# Patient Record
Sex: FEMALE | Race: BLACK OR AFRICAN AMERICAN | NOT HISPANIC OR LATINO | URBAN - METROPOLITAN AREA
[De-identification: names, ages, dates, MRNs, and addresses within clinical notes are randomized per-mention and may not be internally consistent; named-entity substitution may affect disease eponyms.]

---

## 2021-09-13 ENCOUNTER — CLINICAL SUPPORT (OUTPATIENT)
Dept: REHABILITATION | Facility: OTHER | Age: 41
End: 2021-09-13
Payer: MEDICAID

## 2021-09-13 DIAGNOSIS — M54.50 CHRONIC MIDLINE LOW BACK PAIN WITHOUT SCIATICA: ICD-10-CM

## 2021-09-13 DIAGNOSIS — G89.29 CHRONIC MIDLINE LOW BACK PAIN WITHOUT SCIATICA: ICD-10-CM

## 2021-09-13 PROCEDURE — 97161 PT EVAL LOW COMPLEX 20 MIN: CPT | Mod: PN | Performed by: PHYSICAL THERAPIST

## 2021-09-15 ENCOUNTER — PATIENT MESSAGE (OUTPATIENT)
Dept: REHABILITATION | Facility: OTHER | Age: 41
End: 2021-09-15

## 2021-09-18 ENCOUNTER — CLINICAL SUPPORT (OUTPATIENT)
Dept: REHABILITATION | Facility: OTHER | Age: 41
End: 2021-09-18
Payer: MEDICAID

## 2021-09-18 DIAGNOSIS — G89.29 CHRONIC MIDLINE LOW BACK PAIN WITHOUT SCIATICA: Primary | ICD-10-CM

## 2021-09-18 DIAGNOSIS — M54.50 CHRONIC MIDLINE LOW BACK PAIN WITHOUT SCIATICA: Primary | ICD-10-CM

## 2021-09-18 PROCEDURE — 97110 THERAPEUTIC EXERCISES: CPT | Mod: PN

## 2021-09-20 ENCOUNTER — CLINICAL SUPPORT (OUTPATIENT)
Dept: REHABILITATION | Facility: OTHER | Age: 41
End: 2021-09-20
Payer: MEDICAID

## 2021-09-20 DIAGNOSIS — G89.29 CHRONIC MIDLINE LOW BACK PAIN WITHOUT SCIATICA: Primary | ICD-10-CM

## 2021-09-20 DIAGNOSIS — M54.50 CHRONIC MIDLINE LOW BACK PAIN WITHOUT SCIATICA: Primary | ICD-10-CM

## 2021-09-20 PROCEDURE — 97110 THERAPEUTIC EXERCISES: CPT | Mod: PN | Performed by: PHYSICAL THERAPIST

## 2021-09-24 ENCOUNTER — CLINICAL SUPPORT (OUTPATIENT)
Dept: REHABILITATION | Facility: OTHER | Age: 41
End: 2021-09-24
Payer: MEDICAID

## 2021-09-24 DIAGNOSIS — M54.50 CHRONIC MIDLINE LOW BACK PAIN WITHOUT SCIATICA: Primary | ICD-10-CM

## 2021-09-24 DIAGNOSIS — G89.29 CHRONIC MIDLINE LOW BACK PAIN WITHOUT SCIATICA: Primary | ICD-10-CM

## 2021-09-24 PROCEDURE — 97110 THERAPEUTIC EXERCISES: CPT | Mod: PN | Performed by: PHYSICAL THERAPIST

## 2021-09-29 ENCOUNTER — CLINICAL SUPPORT (OUTPATIENT)
Dept: REHABILITATION | Facility: OTHER | Age: 41
End: 2021-09-29
Payer: MEDICAID

## 2021-09-29 DIAGNOSIS — M54.50 CHRONIC MIDLINE LOW BACK PAIN WITHOUT SCIATICA: ICD-10-CM

## 2021-09-29 DIAGNOSIS — G89.29 CHRONIC MIDLINE LOW BACK PAIN WITHOUT SCIATICA: ICD-10-CM

## 2021-09-29 PROCEDURE — 97110 THERAPEUTIC EXERCISES: CPT | Mod: PN,CQ

## 2021-10-01 ENCOUNTER — CLINICAL SUPPORT (OUTPATIENT)
Dept: REHABILITATION | Facility: OTHER | Age: 41
End: 2021-10-01
Payer: MEDICAID

## 2021-10-01 DIAGNOSIS — M54.50 CHRONIC MIDLINE LOW BACK PAIN WITHOUT SCIATICA: ICD-10-CM

## 2021-10-01 DIAGNOSIS — G89.29 CHRONIC MIDLINE LOW BACK PAIN WITHOUT SCIATICA: ICD-10-CM

## 2021-10-01 PROCEDURE — 97110 THERAPEUTIC EXERCISES: CPT | Mod: PN,CQ

## 2021-10-05 ENCOUNTER — CLINICAL SUPPORT (OUTPATIENT)
Dept: REHABILITATION | Facility: OTHER | Age: 41
End: 2021-10-05
Payer: MEDICAID

## 2021-10-05 DIAGNOSIS — G89.29 CHRONIC MIDLINE LOW BACK PAIN WITHOUT SCIATICA: ICD-10-CM

## 2021-10-05 DIAGNOSIS — M54.50 CHRONIC MIDLINE LOW BACK PAIN WITHOUT SCIATICA: ICD-10-CM

## 2021-10-05 PROCEDURE — 97110 THERAPEUTIC EXERCISES: CPT | Mod: PN,CQ

## 2021-10-07 ENCOUNTER — CLINICAL SUPPORT (OUTPATIENT)
Dept: REHABILITATION | Facility: OTHER | Age: 41
End: 2021-10-07
Payer: MEDICAID

## 2021-10-07 DIAGNOSIS — G89.29 CHRONIC MIDLINE LOW BACK PAIN WITHOUT SCIATICA: ICD-10-CM

## 2021-10-07 DIAGNOSIS — M54.50 CHRONIC MIDLINE LOW BACK PAIN WITHOUT SCIATICA: ICD-10-CM

## 2021-10-07 PROCEDURE — 97110 THERAPEUTIC EXERCISES: CPT | Mod: PN

## 2021-10-11 ENCOUNTER — CLINICAL SUPPORT (OUTPATIENT)
Dept: REHABILITATION | Facility: OTHER | Age: 41
End: 2021-10-11
Payer: MEDICAID

## 2021-10-11 DIAGNOSIS — M54.50 CHRONIC MIDLINE LOW BACK PAIN WITHOUT SCIATICA: ICD-10-CM

## 2021-10-11 DIAGNOSIS — G89.29 CHRONIC MIDLINE LOW BACK PAIN WITHOUT SCIATICA: ICD-10-CM

## 2021-10-11 PROCEDURE — 97110 THERAPEUTIC EXERCISES: CPT | Mod: PN

## 2021-10-20 ENCOUNTER — CLINICAL SUPPORT (OUTPATIENT)
Dept: REHABILITATION | Facility: OTHER | Age: 41
End: 2021-10-20
Payer: MEDICAID

## 2021-10-20 DIAGNOSIS — G89.29 CHRONIC MIDLINE LOW BACK PAIN WITHOUT SCIATICA: ICD-10-CM

## 2021-10-20 DIAGNOSIS — M54.50 CHRONIC MIDLINE LOW BACK PAIN WITHOUT SCIATICA: ICD-10-CM

## 2021-10-20 PROCEDURE — 97110 THERAPEUTIC EXERCISES: CPT | Mod: PN

## 2021-10-26 ENCOUNTER — CLINICAL SUPPORT (OUTPATIENT)
Dept: REHABILITATION | Facility: OTHER | Age: 41
End: 2021-10-26
Payer: MEDICAID

## 2021-10-26 DIAGNOSIS — M54.50 CHRONIC MIDLINE LOW BACK PAIN WITHOUT SCIATICA: ICD-10-CM

## 2021-10-26 DIAGNOSIS — G89.29 CHRONIC MIDLINE LOW BACK PAIN WITHOUT SCIATICA: ICD-10-CM

## 2021-10-26 PROCEDURE — 97110 THERAPEUTIC EXERCISES: CPT | Mod: PN

## 2021-10-29 ENCOUNTER — CLINICAL SUPPORT (OUTPATIENT)
Dept: REHABILITATION | Facility: OTHER | Age: 41
End: 2021-10-29
Payer: MEDICAID

## 2021-10-29 DIAGNOSIS — G89.29 CHRONIC MIDLINE LOW BACK PAIN WITHOUT SCIATICA: ICD-10-CM

## 2021-10-29 DIAGNOSIS — M54.50 CHRONIC MIDLINE LOW BACK PAIN WITHOUT SCIATICA: ICD-10-CM

## 2021-10-29 PROCEDURE — 97110 THERAPEUTIC EXERCISES: CPT | Mod: PN

## 2021-11-05 ENCOUNTER — CLINICAL SUPPORT (OUTPATIENT)
Dept: REHABILITATION | Facility: OTHER | Age: 41
End: 2021-11-05
Payer: MEDICAID

## 2021-11-05 DIAGNOSIS — M54.50 CHRONIC MIDLINE LOW BACK PAIN WITHOUT SCIATICA: ICD-10-CM

## 2021-11-05 DIAGNOSIS — G89.29 CHRONIC MIDLINE LOW BACK PAIN WITHOUT SCIATICA: ICD-10-CM

## 2021-11-05 PROCEDURE — 97110 THERAPEUTIC EXERCISES: CPT | Mod: PN

## 2021-11-10 ENCOUNTER — CLINICAL SUPPORT (OUTPATIENT)
Dept: REHABILITATION | Facility: OTHER | Age: 41
End: 2021-11-10
Payer: MEDICAID

## 2021-11-10 DIAGNOSIS — M54.50 CHRONIC MIDLINE LOW BACK PAIN WITHOUT SCIATICA: ICD-10-CM

## 2021-11-10 DIAGNOSIS — G89.29 CHRONIC MIDLINE LOW BACK PAIN WITHOUT SCIATICA: ICD-10-CM

## 2021-11-10 PROCEDURE — 97110 THERAPEUTIC EXERCISES: CPT | Mod: PN

## 2021-11-15 ENCOUNTER — CLINICAL SUPPORT (OUTPATIENT)
Dept: REHABILITATION | Facility: OTHER | Age: 41
End: 2021-11-15
Payer: MEDICAID

## 2021-11-15 DIAGNOSIS — M54.50 CHRONIC MIDLINE LOW BACK PAIN WITHOUT SCIATICA: Primary | ICD-10-CM

## 2021-11-15 DIAGNOSIS — G89.29 CHRONIC MIDLINE LOW BACK PAIN WITHOUT SCIATICA: Primary | ICD-10-CM

## 2021-11-15 PROCEDURE — 97110 THERAPEUTIC EXERCISES: CPT | Mod: PN

## 2021-11-22 ENCOUNTER — CLINICAL SUPPORT (OUTPATIENT)
Dept: REHABILITATION | Facility: OTHER | Age: 41
End: 2021-11-22
Payer: MEDICAID

## 2021-11-22 DIAGNOSIS — M54.50 CHRONIC MIDLINE LOW BACK PAIN WITHOUT SCIATICA: ICD-10-CM

## 2021-11-22 DIAGNOSIS — G89.29 CHRONIC MIDLINE LOW BACK PAIN WITHOUT SCIATICA: ICD-10-CM

## 2021-11-22 PROCEDURE — 97110 THERAPEUTIC EXERCISES: CPT | Mod: PN

## 2021-11-26 ENCOUNTER — CLINICAL SUPPORT (OUTPATIENT)
Dept: REHABILITATION | Facility: OTHER | Age: 41
End: 2021-11-26
Payer: MEDICAID

## 2021-11-26 DIAGNOSIS — M54.50 CHRONIC MIDLINE LOW BACK PAIN WITHOUT SCIATICA: ICD-10-CM

## 2021-11-26 DIAGNOSIS — G89.29 CHRONIC MIDLINE LOW BACK PAIN WITHOUT SCIATICA: ICD-10-CM

## 2021-11-26 PROCEDURE — 97110 THERAPEUTIC EXERCISES: CPT | Mod: PN

## 2021-11-26 PROCEDURE — 97112 NEUROMUSCULAR REEDUCATION: CPT | Mod: PN

## 2021-12-20 ENCOUNTER — CLINICAL SUPPORT (OUTPATIENT)
Dept: REHABILITATION | Facility: OTHER | Age: 41
End: 2021-12-20
Payer: MEDICAID

## 2021-12-20 DIAGNOSIS — M54.50 CHRONIC MIDLINE LOW BACK PAIN WITHOUT SCIATICA: Primary | ICD-10-CM

## 2021-12-20 DIAGNOSIS — G89.29 CHRONIC MIDLINE LOW BACK PAIN WITHOUT SCIATICA: Primary | ICD-10-CM

## 2021-12-20 PROCEDURE — 97110 THERAPEUTIC EXERCISES: CPT | Mod: PN

## 2021-12-23 ENCOUNTER — CLINICAL SUPPORT (OUTPATIENT)
Dept: REHABILITATION | Facility: OTHER | Age: 41
End: 2021-12-23
Payer: MEDICAID

## 2021-12-23 DIAGNOSIS — M54.50 CHRONIC MIDLINE LOW BACK PAIN WITHOUT SCIATICA: ICD-10-CM

## 2021-12-23 DIAGNOSIS — G89.29 CHRONIC MIDLINE LOW BACK PAIN WITHOUT SCIATICA: ICD-10-CM

## 2021-12-23 PROCEDURE — 97112 NEUROMUSCULAR REEDUCATION: CPT | Mod: PN

## 2021-12-23 PROCEDURE — 97110 THERAPEUTIC EXERCISES: CPT | Mod: PN

## 2021-12-29 ENCOUNTER — CLINICAL SUPPORT (OUTPATIENT)
Dept: REHABILITATION | Facility: OTHER | Age: 41
End: 2021-12-29
Payer: MEDICAID

## 2021-12-29 DIAGNOSIS — M54.50 CHRONIC MIDLINE LOW BACK PAIN WITHOUT SCIATICA: ICD-10-CM

## 2021-12-29 DIAGNOSIS — G89.29 CHRONIC MIDLINE LOW BACK PAIN WITHOUT SCIATICA: ICD-10-CM

## 2021-12-29 PROCEDURE — 97110 THERAPEUTIC EXERCISES: CPT | Mod: PN

## 2022-01-05 ENCOUNTER — CLINICAL SUPPORT (OUTPATIENT)
Dept: REHABILITATION | Facility: OTHER | Age: 42
End: 2022-01-05
Payer: MEDICAID

## 2022-01-05 DIAGNOSIS — G89.29 CHRONIC MIDLINE LOW BACK PAIN WITHOUT SCIATICA: Primary | ICD-10-CM

## 2022-01-05 DIAGNOSIS — M54.50 CHRONIC MIDLINE LOW BACK PAIN WITHOUT SCIATICA: Primary | ICD-10-CM

## 2022-01-05 PROCEDURE — 97112 NEUROMUSCULAR REEDUCATION: CPT | Mod: PN

## 2022-01-05 PROCEDURE — 97110 THERAPEUTIC EXERCISES: CPT | Mod: PN

## 2022-01-05 NOTE — PROGRESS NOTES
"  Physical Therapy Daily Treatment Note     Name: Marisabel Celaya  Clinic Number: [id number]  Therapy Diagnosis:   Encounter Diagnosis   Name Primary?    Chronic midline low back pain without sciatica Yes     Physician: Deepika Yancey NP    Visit Date: 1/5/2022    Physician Orders: PT Eval and Treat   Medical Diagnosis from Referral: Low back pain (M54.5)  Evaluation Date: 9/13/2021  Authorization Period Expiration:1/31/2022  Plan of Care Expiration: 01/31/2022  Visit # / Visits authorized: 21/36 (20 visits total)  Re-assessment: 12/20/2021  FOTO: 12/23/2021     Time In: 1:45 pm  Time out: 2:30 pm  Total Billable Time: 45 minutes    Precautions: Standard    Subjective   Pt reports: she has been feeling sore in her legs over the past couple days. Other than that she has nothing new to report.     She was compliant with home exercise program.  Response to previous treatment: felt good  Function: n/a    Pain: 4/10  Location: bilateral back        TREATMENT       Marisabel received therapeutic exercises to develop strength, endurance, ROM, flexibility, posture and core stabilization for 30 minutes including:  Exercises in bold performed today:     SLR w/ TrA piliates ring x15  PPT with isometric add 20x 5 second holds  Dead bug w/ red theraball 2x10 (LE only)  Isometric knee to chest w/ red theraball holds 5x10"  BUE pull down w/ marching 2x10 7#   Palloff press at free motion with 3# 15x B  Split stand pallof press 3# x15 B  Chop at free motion 7# x10 each B   Shuttle press with 2 black cords x30  Dead lift (to chair height) x15# 2x10  +SLDF cone tap x10        Not performed today:  Resisted LTR with pink cook band x30 each  Bridge with UE pull down with pink cook band 3x 10  Side steps with green loop (x-light) at feet and yellow weighted ball outstretched x40 ft each;   Side steps with yellow weighted ball outstretched x40 ft each  Sit<>stand with blue weighted ball close to chest x10  Monster walks with OTB at ankles " "with pilates ring squeeze x40 ft forward and back  PPT w/ bent knee fall out and BTB x20 with 5 second hold  Dead lift series with cueing, demonstration and education   Hip hinge with dowel behind back   Hip hinge with dowel in hands to knee    Hip hinge with dowel w/ BTB resistance  monster walks with OTB at ankles forward and back x30 ft each  Diaphragmatic breathing x3 minutes  Curl up 2x 15    PPT with isometric abd (pilates ring) 30x 5 second holds - BTB today  Dead bug with pilates ring x10 each  Bridge with PPT, GTB for iso abd and glute set 3x 10   +OH pull down with sports cord with TA + SLR 1 x 15 ea LE with pink cord  DKTC reverse crunch 3-5 second holds x3 minutes  LTR on ball x3 minutes  Cat/cow x15  Seated Piriformis stretch 3x30 seconds B  Hamstring stretch 3x 30 B  L Side stretch over wedge 2x 30 seconds on R   TrA 3' x 5"  Bridging 20x  +Prone alt arm/leg lift 10x ea  SL clamshell OTB 20x   [place] 20x  +March walking w/ single 5 # DB  80 ft - emphasis on upright posture   +Lateral stepping w/ 5# DB 80 ft  Supine abdominal bracing with ball 20x - no nausea /pain   Ball roll out forward from seated position, x15    Marisabel participated in neuromuscular re-education activities to improve: Balance, Coordination, Kinesthetic, Sense, Proprioception and Posture for 15 minutes. The following activities were included:    High knee marches w/ 15# KB x40 ft each  Posterior pelvic tilt, 5" x 30  Bridging (with TrA and PPT) 30x    Standing marches with UE pull down x 10 at free motion with 7# B  Around the world with 10# KB CW/CCW x10 each  15# carry B x1 lap  30# carry B x1 lap  TrA 5" x 2 min  B UE pull down with orange band and alt LE marching      Home Exercises Provided and Patient Education Provided     Education provided:   - core engagement  - exercise form and purpose    Written Home Exercises Provided:  yes.  Exercises were reviewed and Marisabel was able to demonstrate them prior to the end of the " session.  Marisabel demonstrated good  understanding of the education provided.     See EMR under Patient Instructions for exercises provided prior visit and today.      Assessment   Pt completed today's therapy session well and with no adverse effects. Continuation of core an hip stabilization exercises provided today. Pt reports fatigue post core exercises and requires frequent rest breaks between sets. Pt required verbal and tactile cues for proper glute activation during dead lift exercise. Pt able to correct after cueing. Will continue to progress exercises per pts tolerance      Marisabel is progressing well towards her goals.   Pt prognosis is Good.     Pt will continue to benefit from skilled outpatient physical therapy to address the deficits listed in the problem list box on initial evaluation, provide pt/family education and to maximize pt's level of independence in the home and community environment.     Pt's spiritual, cultural and educational needs considered and pt agreeable to plan of care and goals.     Anticipated barriers to physical therapy: none       Goals:  Short Term Goals: 6 weeks   1. Patient to be able to tolerate 10 minutes of walking 4/10 pain or less low back for improvements in community ambulation. In progress (continues to have 4+/10 pain) 1/5/2022   2. Patient to be able to perform multisegmental flexion without increase in pain for ease with ADL's such as bending.  In progress (no increase in pain but decreased lumbar segmental flexion) 1/5/2022      Long Term Goals: 12 weeks   1. Patient to be independent with home exercise program for improved self management of condition. In progress 1/5/2022   2. Patient to have decreased subjective report of disability as noted by a score of 20-40% on the FOTO Lumbar questionnaire. In progress  1/5/2022    3. Patient to increased strength in BLE's to 4+/5 or greater for improved performance of ADL's.  In progress 1/5/2022  4. Patient to be able to  stand for greater than 1 hour with 4/10 pain or less low back.  In progress (sits down after 1 hr; after 2 hrs it gets up to 5+/10)  1/5/2022       Plan     Continue with established PT POC and progress per pt tolerance.       Consuelo Rivera, PT

## 2022-01-11 NOTE — PROGRESS NOTES
"  Physical Therapy Daily Treatment Note     Name: Marisabel Celaya  Clinic Number: [id number]  Therapy Diagnosis:   Encounter Diagnosis   Name Primary?    Chronic midline low back pain without sciatica      Physician: Deepika Yancey NP    Visit Date: 1/12/2022    Physician Orders: PT Eval and Treat   Medical Diagnosis from Referral: Low back pain (M54.5)  Evaluation Date: 9/13/2021  Authorization Period Expiration:1/31/2022  Plan of Care Expiration: 01/31/2022  Visit # / Visits authorized: 19/36 (21 visits total)  Re-assessment: 12/20/2021  FOTO: 12/23/2021     Time In: 1:15 pm (late arrival)  Time out: 1:50 pm  Total Billable Time: 35 minutes    Precautions: Standard    Subjective   Pt reports: transportation didn't show up last time and was late today picking her up. Back is still feeling a little stiff but she hasn't stretched today.      She was compliant with home exercise program.  Response to previous treatment: felt good  Function: n/a    Pain: 3-4/10  Location: bilateral back        TREATMENT       Marisabel received therapeutic exercises to develop strength, endurance, ROM, flexibility, posture and core stabilization for 35 minutes including:  Exercises in bold performed today:     SLR w/ TrA piliates ring x15  PPT with isometric add 20x 5 second holds  Dead bug w/ red theraball 2x10 (LE only)  Isometric knee to chest w/ red theraball holds 5x10"  BUE pull down w/ marching x30 red cook band  LTR x2 minutes  Resisted LTR with pink cook band x30 each  DKTC with red swiss ball and UE pull down with pink cook band x30   Palloff press at free motion with 3# 15x B  Tandem pallof press 3# x15 B  + Primal push up 5 secon holds x10  + Tandem open books x15 each      Not performed today:  Chop at free motion 7# x10 each B   Shuttle press with 2 black cords x30  Dead lift (to chair height) x15# 2x10  +SLDF cone tap x10  Resisted LTR with pink cook band x30 each  Bridge with UE pull down with pink cook band 3x 10  Side " "steps with green loop (x-light) at feet and yellow weighted ball outstretched x40 ft each;   Side steps with yellow weighted ball outstretched x40 ft each  Sit<>stand with blue weighted ball close to chest x10  Monster walks with OTB at ankles with pilates ring squeeze x40 ft forward and back  PPT w/ bent knee fall out and BTB x20 with 5 second hold  Dead lift series with cueing, demonstration and education   Hip hinge with dowel behind back   Hip hinge with dowel in hands to knee    Hip hinge with dowel w/ BTB resistance  monster walks with OTB at ankles forward and back x30 ft each  Diaphragmatic breathing x3 minutes  Curl up 2x 15  PPT with isometric abd (pilates ring) 30x 5 second holds - BTB today  Dead bug with pilates ring x10 each  Bridge with PPT, GTB for iso abd and glute set 3x 10   +OH pull down with sports cord with TA + SLR 1 x 15 ea LE with pink cord  DKTC reverse crunch 3-5 second holds x3 minutes  LTR on ball x3 minutes  Cat/cow x15  Seated Piriformis stretch 3x30 seconds B  Hamstring stretch 3x 30 B  L Side stretch over wedge 2x 30 seconds on R   TrA 3' x 5"  Bridging 20x  +Prone alt arm/leg lift 10x ea  SL clamshell OTB 20x   [place] 20x  +March walking w/ single 5 # DB  80 ft - emphasis on upright posture   +Lateral stepping w/ 5# DB 80 ft  Supine abdominal bracing with ball 20x - no nausea /pain   Ball roll out forward from seated position, x15    Marisabel participated in neuromuscular re-education activities to improve: Balance, Coordination, Kinesthetic, Sense, Proprioception and Posture for 00 minutes. The following activities were included:    High knee marches w/ 15# KB x40 ft each  Posterior pelvic tilt, 5" x 30  Bridging (with TrA and PPT) 30x  Standing marches with UE pull down x 10 at free motion with 7# B  Around the world with 10# KB CW/CCW x10 each  15# carry B x1 lap  30# carry B x1 lap  TrA 5" x 2 min  B UE pull down with orange band and alt LE marching      Home Exercises Provided " and Patient Education Provided     Education provided:   - core engagement  - exercise form and purpose    Written Home Exercises Provided:  yes.  Exercises were reviewed and Marisabel was able to demonstrate them prior to the end of the session.  Marisabel demonstrated good  understanding of the education provided.     See EMR under Patient Instructions for exercises provided prior visit and today.      Assessment     Limited treatment session today due to patient arriving late to therapy.  Warm up included stretching today as patient reports stiffness and not performing stretches today.  Added primal push up with difficulty maintaining neutral spine and core engagement.  Added thoracic rotation in tandem to improve mobility as well with difficulty noted particularly with R LE in front and rotation to the L.     Marisabel is progressing well towards her goals.   Pt prognosis is Good.     Pt will continue to benefit from skilled outpatient physical therapy to address the deficits listed in the problem list box on initial evaluation, provide pt/family education and to maximize pt's level of independence in the home and community environment.     Pt's spiritual, cultural and educational needs considered and pt agreeable to plan of care and goals.     Anticipated barriers to physical therapy: none       Goals:  Short Term Goals: 6 weeks   1. Patient to be able to tolerate 10 minutes of walking 4/10 pain or less low back for improvements in community ambulation. In progress (continues to have 4+/10 pain) 1/12/2022   2. Patient to be able to perform multisegmental flexion without increase in pain for ease with ADL's such as bending.  In progress (no increase in pain but decreased lumbar segmental flexion) 1/12/2022      Long Term Goals: 12 weeks   1. Patient to be independent with home exercise program for improved self management of condition. In progress 1/12/2022   2. Patient to have decreased subjective report of disability as  noted by a score of 20-40% on the FOTO Lumbar questionnaire. In progress  1/12/2022    3. Patient to increased strength in BLE's to 4+/5 or greater for improved performance of ADL's.  In progress 1/12/2022  4. Patient to be able to stand for greater than 1 hour with 4/10 pain or less low back.  In progress (sits down after 1 hr; after 2 hrs it gets up to 5+/10)  1/12/2022       Plan     Continue with established PT POC and progress per pt tolerance.       Pushpa Donaldson, PT

## 2022-01-12 ENCOUNTER — CLINICAL SUPPORT (OUTPATIENT)
Dept: REHABILITATION | Facility: OTHER | Age: 42
End: 2022-01-12
Payer: MEDICAID

## 2022-01-12 DIAGNOSIS — M54.50 CHRONIC MIDLINE LOW BACK PAIN WITHOUT SCIATICA: ICD-10-CM

## 2022-01-12 DIAGNOSIS — G89.29 CHRONIC MIDLINE LOW BACK PAIN WITHOUT SCIATICA: ICD-10-CM

## 2022-01-12 PROCEDURE — 97110 THERAPEUTIC EXERCISES: CPT | Mod: PN

## 2022-01-17 NOTE — PROGRESS NOTES
Physical Therapy Daily Treatment Note     Name: Marisabel Celaya  Clinic Number: [id number]  Therapy Diagnosis:   Encounter Diagnosis   Name Primary?    Chronic midline low back pain without sciatica      Physician: Deepika Yancey NP    Visit Date: 1/18/2022    Physician Orders: PT Eval and Treat   Medical Diagnosis from Referral: Low back pain (M54.5)  Evaluation Date: 9/13/2021  Authorization Period Expiration:1/31/2022  Plan of Care Expiration: 9/13/2021 - 01/31/2022  Visit # / Visits authorized: 20/36 (22 visits total)  Re-assessment: 12/20/2021  FOTO: 12/23/2021     Time In: 12:20 pm  Time out: 1:05 pm  Total Billable Time: 40 minutes    Precautions: Standard    Subjective   Pt reports: her transportation has really been an issue for her to get to therapy.  She has been constantly been trying to work on her posture and low back has been feeling good however she is now having issues with her neck and shoulders and upper back now that she has been doing some gardening.  Notes having more good than bad days and wants to continue therapy until she has even more good than bad days and on a more consistent basis.     She was compliant with home exercise program.  Response to previous treatment: felt good  Function: n/a    Pain: 1-2/10 (sitting); 3-4/10 (walking)  Location: bilateral back        TREATMENT       Marisabel received therapeutic exercises to develop strength, endurance, ROM, flexibility, posture and core stabilization for 40 minutes including:  Exercises in bold performed today:     Dead lift series with cueing, demonstration and education   Hip hinge with dowel behind back   Hip hinge with dowel in hands to knee   Palloff press at free motion with 3# 10x B  Tandem pallof press 3# x10 B  + 1/2 kneeling palloff press 3# x10 B  Primal push up 5 second holds x10  Tandem open books x5 each      Not performed today:  SLR w/ TrA piliates ring x15  PPT with isometric add 20x 5 second holds  Dead bug w/ red  "theraball 2x10 (LE only)  Isometric knee to chest w/ red theraball holds 5x10"  B UE pull down w/ marching x30 red cook band  LTR x2 minutes  Resisted LTR with pink cook band x30 each  DKTC with red swiss ball and UE pull down with pink cook band x30  Chop at free motion 7# x10 each B   Shuttle press with 2 black cords x30  Dead lift (to chair height) x15# 2x10  +SLDF cone tap x10  Resisted LTR with pink cook band x30 each  Bridge with UE pull down with pink cook band 3x 10  Side steps with green loop (x-light) at feet and yellow weighted ball outstretched x40 ft each;   Side steps with yellow weighted ball outstretched x40 ft each  Sit<>stand with blue weighted ball close to chest x10  Monster walks with OTB at ankles with pilates ring squeeze x40 ft forward and back  PPT w/ bent knee fall out and BTB x20 with 5 second hold  monster walks with OTB at ankles forward and back x30 ft each  Diaphragmatic breathing x3 minutes  Curl up 2x 15  PPT with isometric abd (pilates ring) 30x 5 second holds - BTB today  Dead bug with pilates ring x10 each  Bridge with PPT, GTB for iso abd and glute set 3x 10   +OH pull down with sports cord with TA + SLR 1 x 15 ea LE with pink cord  DKTC reverse crunch 3-5 second holds x3 minutes  LTR on ball x3 minutes  Cat/cow x15  Seated Piriformis stretch 3x30 seconds B  Hamstring stretch 3x 30 B  L Side stretch over wedge 2x 30 seconds on R   TrA 3' x 5"  Bridging 20x  +Prone alt arm/leg lift 10x ea  SL clamshell OTB 20x   [place] 20x  +March walking w/ single 5 # DB  80 ft - emphasis on upright posture   +Lateral stepping w/ 5# DB 80 ft  Supine abdominal bracing with ball 20x - no nausea /pain   Ball roll out forward from seated position, x15    Marisabel participated in neuromuscular re-education activities to improve: Balance, Coordination, Kinesthetic, Sense, Proprioception and Posture for 00 minutes. The following activities were included:    High knee marches w/ 15# KB x40 ft " "each  Posterior pelvic tilt, 5" x 30  Bridging (with TrA and PPT) 30x  Standing marches with UE pull down x 10 at free motion with 7# B  Around the world with 10# KB CW/CCW x10 each  15# carry B x1 lap  30# carry B x1 lap  TrA 5" x 2 min  B UE pull down with orange band and alt LE marching      Home Exercises Provided and Patient Education Provided     Education provided:   - core engagement  - exercise form and purpose  - dead lift form and cues with posture  - d/c planning  - continuing with exercises at home/gym activities     Written Home Exercises Provided:  yes.  Exercises were reviewed and Marisabel was able to demonstrate them prior to the end of the session.  Marisabel demonstrated good  understanding of the education provided.     See EMR under Patient Instructions for exercises provided prior visit and today.      Assessment     Resumed dead lift training to improve posture and improve hip hinging techniques for both gardening and everyday activities.  Significant cueing for head positioning to avoid excessive extension as well as LE ER to improve hip activation.  Added 1/2 kneeling palloff press for gardening today.  Heavy education and discussion for d/c planning and continuing with exercises at home.     Marisabel is progressing well towards her goals.   Pt prognosis is Good.     Pt will continue to benefit from skilled outpatient physical therapy to address the deficits listed in the problem list box on initial evaluation, provide pt/family education and to maximize pt's level of independence in the home and community environment.     Pt's spiritual, cultural and educational needs considered and pt agreeable to plan of care and goals.     Anticipated barriers to physical therapy: none       Goals:  Short Term Goals: 6 weeks   1. Patient to be able to tolerate 10 minutes of walking 4/10 pain or less low back for improvements in community ambulation. In progress (continues to have 4+/10 pain) 1/18/2022   2. Patient " to be able to perform multisegmental flexion without increase in pain for ease with ADL's such as bending.  In progress (no increase in pain but decreased lumbar segmental flexion) 1/18/2022      Long Term Goals: 12 weeks   1. Patient to be independent with home exercise program for improved self management of condition. In progress 1/18/2022   2. Patient to have decreased subjective report of disability as noted by a score of 20-40% on the FOTO Lumbar questionnaire. In progress  1/18/2022    3. Patient to increased strength in BLE's to 4+/5 or greater for improved performance of ADL's.  In progress 1/18/2022  4. Patient to be able to stand for greater than 1 hour with 4/10 pain or less low back.  In progress (sits down after 1 hr; after 2 hrs it gets up to 5+/10)  1/18/2022       Plan     Continue with established PT POC and progress per pt tolerance.  Tentative D/c at end of scheduled visits     Pushpa Donaldson, PT

## 2022-01-18 ENCOUNTER — CLINICAL SUPPORT (OUTPATIENT)
Dept: REHABILITATION | Facility: OTHER | Age: 42
End: 2022-01-18
Payer: MEDICAID

## 2022-01-18 DIAGNOSIS — G89.29 CHRONIC MIDLINE LOW BACK PAIN WITHOUT SCIATICA: ICD-10-CM

## 2022-01-18 DIAGNOSIS — M54.50 CHRONIC MIDLINE LOW BACK PAIN WITHOUT SCIATICA: ICD-10-CM

## 2022-01-18 PROCEDURE — 97110 THERAPEUTIC EXERCISES: CPT | Mod: PN

## 2022-01-20 ENCOUNTER — CLINICAL SUPPORT (OUTPATIENT)
Dept: REHABILITATION | Facility: OTHER | Age: 42
End: 2022-01-20
Payer: MEDICAID

## 2022-01-20 DIAGNOSIS — G89.29 CHRONIC MIDLINE LOW BACK PAIN WITHOUT SCIATICA: ICD-10-CM

## 2022-01-20 DIAGNOSIS — M54.50 CHRONIC MIDLINE LOW BACK PAIN WITHOUT SCIATICA: ICD-10-CM

## 2022-01-20 PROCEDURE — 97110 THERAPEUTIC EXERCISES: CPT | Mod: PN

## 2022-01-20 NOTE — PROGRESS NOTES
"  Physical Therapy Daily Treatment Note     Name: Marisabel Celaya  Clinic Number: [id number]  Therapy Diagnosis:   Encounter Diagnosis   Name Primary?    Chronic midline low back pain without sciatica      Physician: Deepika Yancey NP    Visit Date: 1/20/2022    Physician Orders: PT Eval and Treat   Medical Diagnosis from Referral: Low back pain (M54.5)  Evaluation Date: 9/13/2021  Authorization Period Expiration:1/31/2022  Plan of Care Expiration: 9/13/2021 - 01/31/2022  Visit # / Visits authorized: 21/36 (23 visits total)  Re-assessment: 12/20/2021  FOTO: 12/23/2021     Time In: 2:35 pm  Time out: 3:15 pm  Total Billable Time: 40 minutes    Precautions: Standard    Subjective   Pt reports: shoulder is bothering her today more than anything.  Low back is feeling pretty good but "I have been moving today" and paying more attention to sitting posture.     She was compliant with home exercise program.  Response to previous treatment: felt good  Function: n/a    Pain: 3/10   Location: bilateral back        TREATMENT       Marisabel received therapeutic exercises to develop strength, endurance, ROM, flexibility, posture and core stabilization for 40 minutes including:  Exercises in bold performed today:     Plank at EOM 3x 30 seconds  Quadruped alt legs 2x 10 each  Quadruped alt arms 2x 10 each  Quadruped alt legs and arms x10 each  Side steps with UE push on pilates ring x40 ft each  Isometric squat hold with blue weighted ball 15x 5 second hold  Chair tap squat with diagonal lifting and blue weighted ball x5 each  Supine dead bug legs with Green (x-ligaht) loop around feet 2x 10      Not performed today:  Dead lift series with cueing, demonstration and education   Hip hinge with dowel behind back   Hip hinge with dowel in hands to knee   Palloff press at free motion with 3# 10x B  Tandem pallof press 3# x10 B  + 1/2 kneeling palloff press 3# x10 B  Primal push up 5 second holds x10  Tandem open books x5 each  SLR w/ TrA " "piliates ring x15  PPT with isometric add 20x 5 second holds  Dead bug w/ red theraball 2x10 (LE only)  Isometric knee to chest w/ red theraball holds 5x10"  B UE pull down w/ marching x30 red cook band  LTR x2 minutes  Resisted LTR with pink cook band x30 each  DKTC with red swiss ball and UE pull down with pink cook band x30  Chop at free motion 7# x10 each B   Shuttle press with 2 black cords x30  Dead lift (to chair height) x15# 2x10  +SLDF cone tap x10  Resisted LTR with pink cook band x30 each  Bridge with UE pull down with pink cook band 3x 10  Side steps with green loop (x-light) at feet and yellow weighted ball outstretched x40 ft each;   Side steps with yellow weighted ball outstretched x40 ft each  Sit<>stand with blue weighted ball close to chest x10  Monster walks with OTB at ankles with pilates ring squeeze x40 ft forward and back  PPT w/ bent knee fall out and BTB x20 with 5 second hold  monster walks with OTB at ankles forward and back x30 ft each  Diaphragmatic breathing x3 minutes  Curl up 2x 15  PPT with isometric abd (pilates ring) 30x 5 second holds - BTB today  Dead bug with pilates ring x10 each  Bridge with PPT, GTB for iso abd and glute set 3x 10   +OH pull down with sports cord with TA + SLR 1 x 15 ea LE with pink cord  DKTC reverse crunch 3-5 second holds x3 minutes  LTR on ball x3 minutes  Cat/cow x15  Seated Piriformis stretch 3x30 seconds B  Hamstring stretch 3x 30 B  L Side stretch over wedge 2x 30 seconds on R   TrA 3' x 5"  Bridging 20x  +Prone alt arm/leg lift 10x ea  SL clamshell OTB 20x   [place] 20x  +March walking w/ single 5 # DB  80 ft - emphasis on upright posture   +Lateral stepping w/ 5# DB 80 ft  Supine abdominal bracing with ball 20x - no nausea /pain   Ball roll out forward from seated position, x15    Marisabel participated in neuromuscular re-education activities to improve: Balance, Coordination, Kinesthetic, Sense, Proprioception and Posture for 00 minutes. The " "following activities were included:    High knee marches w/ 15# KB x40 ft each  Posterior pelvic tilt, 5" x 30  Bridging (with TrA and PPT) 30x  Standing marches with UE pull down x 10 at free motion with 7# B  Around the world with 10# KB CW/CCW x10 each  15# carry B x1 lap  30# carry B x1 lap  TrA 5" x 2 min  B UE pull down with orange band and alt LE marching      Home Exercises Provided and Patient Education Provided     Education provided:   - core engagement  - exercise form and purpose  - d/c planning  - continuing with exercises at home/gym activities     Written Home Exercises Provided:  yes.  Exercises were reviewed and Marisabel was able to demonstrate them prior to the end of the session.  Marisabel demonstrated good  understanding of the education provided.     See EMR under Patient Instructions for exercises provided prior visit and today.      Assessment     Progressed therapy exercises with quadruped exercises and notable difficulty with proper positioning as particularly with R LE extension compensating with hip rotation.  Overall no increase in symptoms and continues to progress well towards d/c at end of scheduled visits.     Marisabel is progressing well towards her goals.   Pt prognosis is Good.     Pt will continue to benefit from skilled outpatient physical therapy to address the deficits listed in the problem list box on initial evaluation, provide pt/family education and to maximize pt's level of independence in the home and community environment.     Pt's spiritual, cultural and educational needs considered and pt agreeable to plan of care and goals.     Anticipated barriers to physical therapy: none       Goals:  Short Term Goals: 6 weeks   1. Patient to be able to tolerate 10 minutes of walking 4/10 pain or less low back for improvements in community ambulation. In progress (continues to have 4+/10 pain) 1/20/2022   2. Patient to be able to perform multisegmental flexion without increase in pain for " ease with ADL's such as bending.  In progress (no increase in pain but decreased lumbar segmental flexion) 1/20/2022      Long Term Goals: 12 weeks   1. Patient to be independent with home exercise program for improved self management of condition. In progress 1/20/2022   2. Patient to have decreased subjective report of disability as noted by a score of 20-40% on the FOTO Lumbar questionnaire. In progress  1/20/2022    3. Patient to increased strength in BLE's to 4+/5 or greater for improved performance of ADL's.  In progress 1/20/2022  4. Patient to be able to stand for greater than 1 hour with 4/10 pain or less low back.  In progress (sits down after 1 hr; after 2 hrs it gets up to 5+/10)  1/20/2022       Plan     Continue with established PT POC and progress per pt tolerance.  Tentative D/c at end of scheduled visits     Pushpa Donaldson, PT

## 2022-01-25 ENCOUNTER — CLINICAL SUPPORT (OUTPATIENT)
Dept: REHABILITATION | Facility: OTHER | Age: 42
End: 2022-01-25
Payer: MEDICAID

## 2022-01-25 DIAGNOSIS — M54.50 CHRONIC MIDLINE LOW BACK PAIN WITHOUT SCIATICA: Primary | ICD-10-CM

## 2022-01-25 DIAGNOSIS — G89.29 CHRONIC MIDLINE LOW BACK PAIN WITHOUT SCIATICA: Primary | ICD-10-CM

## 2022-01-25 PROCEDURE — 97110 THERAPEUTIC EXERCISES: CPT | Mod: PN

## 2022-01-25 NOTE — PROGRESS NOTES
Physical Therapy Daily Treatment Note     Name: Marisabel Celaya  Clinic Number: [id number]  Therapy Diagnosis:   Encounter Diagnosis   Name Primary?    Chronic midline low back pain without sciatica Yes     Physician: Deepika Yancey NP    Visit Date: 1/25/2022    Physician Orders: PT Eval and Treat   Medical Diagnosis from Referral: Low back pain (M54.5)  Evaluation Date: 9/13/2021  Authorization Period Expiration:1/31/2022  Plan of Care Expiration: 9/13/2021 - 01/31/2022  Visit # / Visits authorized: 22/36 (24 visits total)  Re-assessment: 12/20/2021  FOTO: 12/23/2021     Time In: 1:30 pm  Time out: 2:15 pm  Total Billable Time: 45 minutes    Precautions: Standard    Subjective   Pt reports: feeling really good today. No complaints    She was compliant with home exercise program.  Response to previous treatment: felt good  Function: n/a    Pain: 2/10   Location: bilateral back        TREATMENT       Marisabel received therapeutic exercises to develop strength, endurance, ROM, flexibility, posture and core stabilization for 40 minutes including:  Exercises in bold performed today:   LTR x2 minutes  Resisted LTR with pink cook band x30 each  Plank at EOM 3x 30 seconds  Quadruped alt legs 2x 10 each  Quadruped alt arms 2x 10 each  Quadruped alt legs and arms x10 each  Side steps with UE push on pilates ring x40 ft each  Isometric squat hold with blue weighted ball 15x 5 second hold  Chair tap squat with diagonal lifting and blue weighted ball x5 each  Supine dead bug legs with 2# weight x10  Bride with alt leg extension x10   Clamshells x 20 B  Elevated clamshells x15 B      Not performed today:  Dead lift series with cueing, demonstration and education   Hip hinge with dowel behind back   Hip hinge with dowel in hands to knee   Palloff press at free motion with 3# 10x B  Tandem pallof press 3# x10 B  + 1/2 kneeling palloff press 3# x10 B  Primal push up 5 second holds x10  Tandem open books x5 each  SLR w/ TrA piliates  "ring x15  PPT with isometric add 20x 5 second holds  Dead bug w/ red theraball 2x10 (LE only)  Isometric knee to chest w/ red theraball holds 5x10"  B UE pull down w/ marching x30 red cook band  DKTC with red swiss ball and UE pull down with pink cook band x30  Chop at free motion 7# x10 each B   Shuttle press with 2 black cords x30  Dead lift (to chair height) x15# 2x10  +SLDF cone tap x10  Resisted LTR with pink cook band x30 each  Bridge with UE pull down with pink cook band 3x 10  Side steps with green loop (x-light) at feet and yellow weighted ball outstretched x40 ft each;   Side steps with yellow weighted ball outstretched x40 ft each  Sit<>stand with blue weighted ball close to chest x10  Monster walks with OTB at ankles with pilates ring squeeze x40 ft forward and back  PPT w/ bent knee fall out and BTB x20 with 5 second hold  monster walks with OTB at ankles forward and back x30 ft each  Diaphragmatic breathing x3 minutes  Curl up 2x 15  PPT with isometric abd (pilates ring) 30x 5 second holds - BTB today  Dead bug with pilates ring x10 each  Bridge with PPT, GTB for iso abd and glute set 3x 10   +OH pull down with sports cord with TA + SLR 1 x 15 ea LE with pink cord  DKTC reverse crunch 3-5 second holds x3 minutes  LTR on ball x3 minutes  Cat/cow x15  Seated Piriformis stretch 3x30 seconds B  Hamstring stretch 3x 30 B  L Side stretch over wedge 2x 30 seconds on R   TrA 3' x 5"  Bridging 20x  +Prone alt arm/leg lift 10x ea  SL clamshell OTB 20x   [place] 20x  +March walking w/ single 5 # DB  80 ft - emphasis on upright posture   +Lateral stepping w/ 5# DB 80 ft  Supine abdominal bracing with ball 20x - no nausea /pain   Ball roll out forward from seated position, x15    Marisabel participated in neuromuscular re-education activities to improve: Balance, Coordination, Kinesthetic, Sense, Proprioception and Posture for 00 minutes. The following activities were included:    High knee marches w/ 15# KB x40 ft " "each  Posterior pelvic tilt, 5" x 30  Bridging (with TrA and PPT) 30x  Standing marches with UE pull down x 10 at free motion with 7# B  Around the world with 10# KB CW/CCW x10 each  15# carry B x1 lap  30# carry B x1 lap  TrA 5" x 2 min  B UE pull down with orange band and alt LE marching      Home Exercises Provided and Patient Education Provided     Education provided:   - core engagement  - exercise form and purpose  - d/c planning  - continuing with exercises at home/gym activities     Written Home Exercises Provided:  yes.  Exercises were reviewed and Marisabel was able to demonstrate them prior to the end of the session.  Marisabel demonstrated good  understanding of the education provided.     See EMR under Patient Instructions for exercises provided prior visit and today.      Assessment   Pt had notable difficulty with completing quadruped exercises. Better with cueing.   Overall no increase in symptoms and continues to progress well towards d/c at end of scheduled visits.     Marisabel is progressing well towards her goals.   Pt prognosis is Good.     Pt will continue to benefit from skilled outpatient physical therapy to address the deficits listed in the problem list box on initial evaluation, provide pt/family education and to maximize pt's level of independence in the home and community environment.     Pt's spiritual, cultural and educational needs considered and pt agreeable to plan of care and goals.     Anticipated barriers to physical therapy: none       Goals:  Short Term Goals: 6 weeks   1. Patient to be able to tolerate 10 minutes of walking 4/10 pain or less low back for improvements in community ambulation. In progress (continues to have 4+/10 pain) 1/25/2022   2. Patient to be able to perform multisegmental flexion without increase in pain for ease with ADL's such as bending.  In progress (no increase in pain but decreased lumbar segmental flexion) 1/25/2022      Long Term Goals: 12 weeks   1. Patient " to be independent with home exercise program for improved self management of condition. In progress 1/25/2022   2. Patient to have decreased subjective report of disability as noted by a score of 20-40% on the FOTO Lumbar questionnaire. In progress  1/25/2022    3. Patient to increased strength in BLE's to 4+/5 or greater for improved performance of ADL's.  In progress 1/25/2022  4. Patient to be able to stand for greater than 1 hour with 4/10 pain or less low back.  In progress (sits down after 1 hr; after 2 hrs it gets up to 5+/10)  1/25/2022       Plan     Continue with established PT POC and progress per pt tolerance.  Tentative D/c at end of scheduled visits     Fran Mcintosh, PT

## 2022-01-28 ENCOUNTER — CLINICAL SUPPORT (OUTPATIENT)
Dept: REHABILITATION | Facility: OTHER | Age: 42
End: 2022-01-28
Payer: MEDICAID

## 2022-01-28 DIAGNOSIS — M54.50 CHRONIC MIDLINE LOW BACK PAIN WITHOUT SCIATICA: Primary | ICD-10-CM

## 2022-01-28 DIAGNOSIS — G89.29 CHRONIC MIDLINE LOW BACK PAIN WITHOUT SCIATICA: Primary | ICD-10-CM

## 2022-01-28 PROCEDURE — 97110 THERAPEUTIC EXERCISES: CPT | Mod: PN

## 2022-01-28 NOTE — PROGRESS NOTES
Physical Therapy Discharge Note     Name: Marisabel Celaya  Clinic Number: [id number]  Therapy Diagnosis:   Encounter Diagnosis   Name Primary?    Chronic midline low back pain without sciatica Yes     Physician: Deepika Yancey NP    Visit Date: 1/28/2022    Physician Orders: PT Eval and Treat   Medical Diagnosis from Referral: Low back pain (M54.5)  Evaluation Date: 9/13/2021  Authorization Period Expiration:1/31/2022  Plan of Care Expiration: 9/13/2021 - 01/31/2022  Visit # / Visits authorized: 26/36 (24 visits total)  Re-assessment: 12/20/2021  FOTO: 12/23/2021     Time In: 1:30 pm  Time out: 2:15 pm  Total Billable Time: 45 minutes    Date of Last visit: 01/28/2022  Total Visits Received: 26    Precautions: Standard    Subjective   Pt reports: feeling really good today. No complaints    She was compliant with home exercise program.  Response to previous treatment: felt good  Function: n/a    Pain: 2/10   Location: bilateral back        TREATMENT   Hip flexion: 4/5 on R: 4/5 on L  Hip Extension: 4/5 B  Hip ABD: 4-/5 B  Hip Add: 4/5 B  Hip IR: 4/5 on R; 4-/5 on L  Hip ER: 4/5 on R; 4/5 on L    Marisabel received therapeutic exercises to develop strength, endurance, ROM, flexibility, posture and core stabilization for 40 minutes including:  Exercises in bold performed today:      LTR x 2 minutes  Resisted LTR with pink cook band x30 each  Plank at EOM 3x 30 seconds  +Fire hydrants x 15 OTB   Quadruped alt legs 2x 10 each  Quadruped alt arms 2x 10 each  Quadruped alt legs and arms x10 each  Side steps with UE push on pilates ring x40 ft each  Isometric squat hold with blue weighted ball 15x 5 second hold  Chair tap squat with diagonal lifting and blue weighted ball x5 each  Supine dead bug legs with 2# weight x10  Bride with alt leg extension x10   Clamshells x 20 B   +Shuttle board SL single leg 2x10   Elevated clamshells x15 B    Not performed today:  Dead lift series with cueing, demonstration and education   Hip  "hinge with dowel behind back   Hip hinge with dowel in hands to knee   Palloff press at free motion with 3# 10x B  Tandem pallof press 3# x10 B  + 1/2 kneeling palloff press 3# x10 B  Primal push up 5 second holds x10  Tandem open books x5 each  SLR w/ TrA piliates ring x15  PPT with isometric add 20x 5 second holds  Dead bug w/ red theraball 2x10 (LE only)  Isometric knee to chest w/ red theraball holds 5x10"  B UE pull down w/ marching x30 red cook band  DKTC with red swiss ball and UE pull down with pink cook band x30  Chop at free motion 7# x10 each B   Shuttle press with 2 black cords x30  Dead lift (to chair height) x15# 2x10  +SLDF cone tap x10  Resisted LTR with pink cook band x30 each  Bridge with UE pull down with pink cook band 3x 10  Side steps with green loop (x-light) at feet and yellow weighted ball outstretched x40 ft each;   Side steps with yellow weighted ball outstretched x40 ft each  Sit<>stand with blue weighted ball close to chest x10  Monster walks with OTB at ankles with pilates ring squeeze x40 ft forward and back  PPT w/ bent knee fall out and BTB x20 with 5 second hold  monster walks with OTB at ankles forward and back x30 ft each  Diaphragmatic breathing x3 minutes  Curl up 2x 15  PPT with isometric abd (pilates ring) 30x 5 second holds - BTB today  Dead bug with pilates ring x10 each  Bridge with PPT, GTB for iso abd and glute set 3x 10   +OH pull down with sports cord with TA + SLR 1 x 15 ea LE with pink cord  DKTC reverse crunch 3-5 second holds x3 minutes  LTR on ball x3 minutes  Cat/cow x15  Seated Piriformis stretch 3x30 seconds B  Hamstring stretch 3x 30 B  L Side stretch over wedge 2x 30 seconds on R   TrA 3' x 5"  Bridging 20x  +Prone alt arm/leg lift 10x ea  SL clamshell OTB 20x   [place] 20x  +March walking w/ single 5 # DB  80 ft - emphasis on upright posture   +Lateral stepping w/ 5# DB 80 ft  Supine abdominal bracing with ball 20x - no nausea /pain   Ball roll out forward " "from seated position, x15    Marisabel participated in neuromuscular re-education activities to improve: Balance, Coordination, Kinesthetic, Sense, Proprioception and Posture for 00 minutes. The following activities were included:    High knee marches w/ 15# KB x40 ft each  Posterior pelvic tilt, 5" x 30  Bridging (with TrA and PPT) 30x  Standing marches with UE pull down x 10 at free motion with 7# B  Around the world with 10# KB CW/CCW x10 each  15# carry B x1 lap  30# carry B x1 lap  TrA 5" x 2 min  B UE pull down with orange band and alt LE marching    Home Exercises Provided and Patient Education Provided     Education provided:   - core engagement  - exercise form and purpose  - d/c planning  - continuing with exercises at home/gym activities     Written Home Exercises Provided:  yes.  Exercises were reviewed and Marisabel was able to demonstrate them prior to the end of the session.  Marisabel demonstrated good  understanding of the education provided.     See EMR under Patient Instructions for exercises provided prior visit and today.      Assessment   Marisabel completed today's therapy session well and with no adverse effects. Continuation of core and lumbar stabilization exercises provided this visit. Reviewed HEP with patient. Pt is DC'd at this time due to reaching max potential at this time. Pt has made significant progress in overall functional strength, postural awareness during functional activities and pain when completing ADL's.     Discharge FOTO Score: 33%  Discharge reason: Patient has met all of his/her goals    Goals:  Short Term Goals: 6 weeks   1. Patient to be able to tolerate 10 minutes of walking 4/10 pain or less low back for improvements in community ambulation. In progress (continues to have 4+/10 pain) 1/28/2022   2. Patient to be able to perform multisegmental flexion without increase in pain for ease with ADL's such as bending.  In progress (no increase in pain but decreased lumbar segmental " flexion) 1/28/2022      Long Term Goals: 12 weeks   1. Patient to be independent with home exercise program for improved self management of condition. MET 1/28/2022   2. Patient to have decreased subjective report of disability as noted by a score of 20-40% on the FOTO Lumbar questionnaire. MET  1/28/2022    3. Patient to increased strength in BLE's to 4+/5 or greater for improved performance of ADL's. MET 1/28/2022  4. Patient to be able to stand for greater than 1 hour with 4/10 pain or less low back.  MET (sits down after 1 hr; after 2 hrs it gets up to 5+/10)  1/28/2022     Plan   This patient is discharged from Physical Therapy       oCnsuelo Rivera PT